# Patient Record
Sex: MALE | Race: WHITE | NOT HISPANIC OR LATINO
[De-identification: names, ages, dates, MRNs, and addresses within clinical notes are randomized per-mention and may not be internally consistent; named-entity substitution may affect disease eponyms.]

---

## 2018-07-01 ENCOUNTER — TRANSCRIPTION ENCOUNTER (OUTPATIENT)
Age: 58
End: 2018-07-01

## 2019-01-11 ENCOUNTER — NON-APPOINTMENT (OUTPATIENT)
Age: 59
End: 2019-01-11

## 2019-01-11 ENCOUNTER — APPOINTMENT (OUTPATIENT)
Dept: FAMILY MEDICINE | Facility: CLINIC | Age: 59
End: 2019-01-11
Payer: COMMERCIAL

## 2019-01-11 VITALS
SYSTOLIC BLOOD PRESSURE: 124 MMHG | DIASTOLIC BLOOD PRESSURE: 84 MMHG | HEART RATE: 83 BPM | WEIGHT: 214 LBS | OXYGEN SATURATION: 95 % | BODY MASS INDEX: 28.99 KG/M2 | HEIGHT: 72 IN

## 2019-01-11 VITALS — DIASTOLIC BLOOD PRESSURE: 70 MMHG | SYSTOLIC BLOOD PRESSURE: 128 MMHG

## 2019-01-11 DIAGNOSIS — Z13.1 ENCOUNTER FOR SCREENING FOR DIABETES MELLITUS: ICD-10-CM

## 2019-01-11 DIAGNOSIS — E78.00 PURE HYPERCHOLESTEROLEMIA, UNSPECIFIED: ICD-10-CM

## 2019-01-11 DIAGNOSIS — Z00.00 ENCOUNTER FOR GENERAL ADULT MEDICAL EXAMINATION W/OUT ABNORMAL FINDINGS: ICD-10-CM

## 2019-01-11 DIAGNOSIS — Z12.5 ENCOUNTER FOR SCREENING FOR MALIGNANT NEOPLASM OF PROSTATE: ICD-10-CM

## 2019-01-11 DIAGNOSIS — E55.9 VITAMIN D DEFICIENCY, UNSPECIFIED: ICD-10-CM

## 2019-01-11 DIAGNOSIS — Z13.29 ENCOUNTER FOR SCREENING FOR OTHER SUSPECTED ENDOCRINE DISORDER: ICD-10-CM

## 2019-01-11 DIAGNOSIS — Z83.2 FAMILY HISTORY OF DISEASES OF THE BLOOD AND BLOOD-FORMING ORGANS AND CERTAIN DISORDERS INVOLVING THE IMMUNE MECHANISM: ICD-10-CM

## 2019-01-11 DIAGNOSIS — Z84.1 FAMILY HISTORY OF DISORDERS OF KIDNEY AND URETER: ICD-10-CM

## 2019-01-11 DIAGNOSIS — B35.3 TINEA PEDIS: ICD-10-CM

## 2019-01-11 PROCEDURE — 93000 ELECTROCARDIOGRAM COMPLETE: CPT

## 2019-01-11 PROCEDURE — 99396 PREV VISIT EST AGE 40-64: CPT | Mod: 25

## 2019-01-11 RX ORDER — ECONAZOLE NITRATE 10 MG/G
1 CREAM TOPICAL DAILY
Qty: 1 | Refills: 2 | Status: ACTIVE | COMMUNITY
Start: 2019-01-11 | End: 1900-01-01

## 2019-01-11 NOTE — ASSESSMENT
[FreeTextEntry1] : EKG performed: SR at 78 bpm, normal axis, no ST/T  changes  \par \par see lab orders \par \par see med orders \par \par see radiology orders

## 2019-01-11 NOTE — PHYSICAL EXAM
[No Acute Distress] : no acute distress [Well Nourished] : well nourished [Well Developed] : well developed [Well-Appearing] : well-appearing [PERRL] : pupils equal round and reactive to light [EOMI] : extraocular movements intact [Normal Oropharynx] : the oropharynx was normal [No JVD] : no jugular venous distention [Supple] : supple [No Lymphadenopathy] : no lymphadenopathy [No Respiratory Distress] : no respiratory distress  [Clear to Auscultation] : lungs were clear to auscultation bilaterally [No Accessory Muscle Use] : no accessory muscle use [Normal Rate] : normal rate  [Regular Rhythm] : with a regular rhythm [Normal S1, S2] : normal S1 and S2 [No Carotid Bruits] : no carotid bruits [Soft] : abdomen soft [Non Tender] : non-tender [No Masses] : no abdominal mass palpated [No HSM] : no HSM [Normal Bowel Sounds] : normal bowel sounds [Normal Posterior Cervical Nodes] : no posterior cervical lymphadenopathy [Normal Anterior Cervical Nodes] : no anterior cervical lymphadenopathy [No CVA Tenderness] : no CVA  tenderness [Grossly Normal Strength/Tone] : grossly normal strength/tone [No Rash] : no rash [Normal Gait] : normal gait [Coordination Grossly Intact] : coordination grossly intact [No Focal Deficits] : no focal deficits [Normal Affect] : the affect was normal [Normal Mood] : the mood was normal [Normal Insight/Judgement] : insight and judgment were intact

## 2019-01-11 NOTE — HEALTH RISK ASSESSMENT
[Patient reported colonoscopy was normal] : Patient reported colonoscopy was normal [ColonoscopyDate] : 05/12 [ColonoscopyComments] : +polyp repeat 05/15

## 2019-01-11 NOTE — HISTORY OF PRESENT ILLNESS
[FreeTextEntry1] : patient here for CPE [de-identified] : CPE \par as above negative FAST no CP/SOB c activity +active workday no dizziness no palpitations +BM qd-bid no bloody/black stools \par no urinary complaints    \par \par +smoker <1ppd x 40 yrs

## 2019-02-25 ENCOUNTER — MEDICATION RENEWAL (OUTPATIENT)
Age: 59
End: 2019-02-25

## 2019-02-26 ENCOUNTER — MEDICATION RENEWAL (OUTPATIENT)
Age: 59
End: 2019-02-26

## 2022-02-24 ENCOUNTER — APPOINTMENT (OUTPATIENT)
Dept: ORTHOPEDIC SURGERY | Facility: CLINIC | Age: 62
End: 2022-02-24
Payer: COMMERCIAL

## 2022-02-24 VITALS
HEART RATE: 85 BPM | BODY MASS INDEX: 28.44 KG/M2 | WEIGHT: 210 LBS | SYSTOLIC BLOOD PRESSURE: 130 MMHG | HEIGHT: 72 IN | DIASTOLIC BLOOD PRESSURE: 75 MMHG

## 2022-02-24 PROCEDURE — 73030 X-RAY EXAM OF SHOULDER: CPT | Mod: RT

## 2022-02-24 PROCEDURE — 99204 OFFICE O/P NEW MOD 45 MIN: CPT | Mod: 25

## 2022-02-24 PROCEDURE — 20610 DRAIN/INJ JOINT/BURSA W/O US: CPT | Mod: RT

## 2022-02-25 NOTE — HISTORY OF PRESENT ILLNESS
[Stable] : stable [___ mths] : [unfilled] month(s) ago [2] : a current pain level of 2/10 [3] : an average pain level of 3/10 [Lifting] : worsened by lifting [Acetaminophen] : relieved by acetaminophen [NSAIDs] : relieved by nonsteroidal anti-inflammatory drugs [Rest] : relieved by rest [de-identified] : HEIKE ISBELL is a 61 year y/o male who presents for initial visit of Right shoulder pain. He reports constant pain since past Oct 2021, with no specific ALMA. He reports dull achy pain, that is worsened with most shoulder movement such as FF. He localizes most of his pain over supraspinatus attachment by humeral head. He denies numbness or tingling down to extremity. He denies radiating pain down to elbow. He is able to sleep through night, but reports on few days waking up due to pain. He has not done any formal PT for this. He occasionally takes OTC NSAIDs that provide relief.

## 2022-02-25 NOTE — ADDENDUM
[FreeTextEntry1] : Documented by Tito Shah acting as a scribe for Dr. Barillas on 02/24/2022. \par \par All medical record entries made by the Scribe were at my, Dr. Barillas's, direction and\par personally dictated by me on 02/24/2022. I have reviewed the chart and agree that the record\par accurately reflects my personal performance of the history, physical exam, procedure and imaging.

## 2022-02-25 NOTE — PHYSICAL EXAM
[de-identified] : Physical Exam:\par General: Well appearing, no acute distress\par Neurologic: A&Ox3, No focal deficits\par Head: NCAT without abrasions, lacerations, or ecchymosis to head, face, or scalp\par Eyes: No scleral icterus, no gross abnormalities\par Respiratory: Equal chest wall expansion bilaterally, no accessory muscle use\par Lymphatic: No lymphadenopathy palpated\par Skin: Warm and dry\par Psychiatric: Normal mood and affect\par \par C-Spine\par Palpation: Tenderness to paraspinal muscles and trapezius muscle\par ROM: side bending, symmetrical. Pain with extension and flexion of the neck.\par Reflexes: C5-7 [normal]\par \par Right Shoulder\par ·	Inspection/Palpation: Tenderness at supraspinatus insertion, no swelling or deformities\par ·	Range of Motion: no crepitus with ROM; Active FF 0-160; ER at side 45; IR to back pocket with pain ; Passive FF 0-160; ER at side 45; IR to back pocket with pain\par ·	Strength: forward elevation in scapular plane 5/5, internal rotation 5/5, external rotation 5/5, adduction 5/5 and abduction 5/5 but hitching\par ·	Stability: no joint instability on provocative testing\par ·	Tests: Sol test negative, Neer sign negative, POS drop arm test secondary to pain, bear hug test POS, Napolean sign POS, cross arm adduction POS, lift off sign positive, hornblowers sign negative, speeds test POS, Yergason's test POS, no bicipital groove tenderness, Whipple's Active Compression test negative \par \par Left Shoulder\par ·	Inspection/Palpation: no tenderness, swelling or deformities\par ·	Range of Motion: full and painless in all planes, no crepitus\par ·	Strength: forward elevation in scapular plane 5/5, internal rotation 5/5, external rotation 5/5, adduction 5/5 and abduction 5/5\par ·	Stability: no joint instability on provocative testing\par ·	Tests: Sol test negative, Neer sign negative, negative drop arm test secondary to pain, bear hug test negative, Napolean sign negative, cross arm adduction negative, lift off sign positive, hornblowers sign negative, speeds test negative, Yergason's test negative, no bicipital groove tenderness, Whipple's Active Compression test negative  [de-identified] : The following radiographs were ordered and read by me during this patients visit. I reviewed each radiograph in detail with the patient and discussed the findings as highlighted below. \par \par 4 views of the Right shoulder show calcium deposit noted at supraspinatus attachment, otherwise, no fracture, dislocation or bony lesions. There is no evidence of degenerative change in the glenohumeral and acromioclavicular joints with maintenance of the joint space. Otherwise unremarkable.

## 2022-02-25 NOTE — DISCUSSION/SUMMARY
[de-identified] : HEIKE ISBELL is a 61 year y/o male who presents for initial visit of Right shoulder pain. He reports constant pain since past Oct 2021, with no specific ALMA. He reports dull achy pain, that is worsened with most shoulder movement such as FF. He localizes most of his pain over supraspinatus attachment by humeral head. He denies numbness or tingling down to extremity. He denies radiating pain down to elbow. He is able to sleep through night, but reports on few days waking up due to pain. He has not done any formal PT for this. He occasionally takes OTC NSAIDs that provide relief. \par \par We had a thorough discussion regarding the nature of his pain, the pathophysiology, as well as all treatment options. The patient presents today with acute onset of Right shoulder pain consistent with a clinical diagnosis of calcific tendinopathy. Radiographs show a calcification at the rotator cuff insertion. Clinically, the patient specific point tenderness to this location as well as positive impingement signs. I discussed the pathophysiology of the diagnosis and the distinction between the resorptive and formative phase.  I discussed the treatment of calcific tendinitis with the patient at length today and the inflammatory process it incites during resorptive phase.\par \par I discussed the treatment of calcific tendinitis with the patient including nonoperative management as first line treatment. Anti-inflammatory medications (NSAID's) with physical therapy for strengthening and conditioning of the joint to preserve shoulder range of motion is typically required. Corticosteroid injection and/or US guided percutaneous aspiration and lavage may be indicated for refractory cases and for acute symptomatic pain relief. If nonoperative management fails, arthroscopic debridement with possible rotator cuff repair if damaged during surgery may be required for recalcitrant cases.\par \par The patient elected to proceed with nonoperative management including a cortisone injection performed today that he tolerated well alongside starting formal PT in 1 week from today, 2x/week until we see them again. We will see them again for clinical reassessment in 6-8 weeks. He agrees with the above plan and all questions were answered.

## 2022-02-25 NOTE — PROCEDURE
[de-identified] : Injection: Right shoulder (Subacromial). \par Indication: Calcific tendinitis \par \par A discussion was had with the patient regarding this procedure and all questions were answered. All risks, benefits and alternatives were discussed. These included but were not limited to bleeding, infection, and allergic reaction. Alcohol was used to clean the skin, and betadine was used to sterilize and prep the area in the posterior aspect of the right shoulder. Ethyl chloride spray was then used as a topical anesthetic. A 22-gauge needle was used to inject 3cc 1% xylocaine, 2cc 0.25% bupivacaine and 1cc of 40mg/mL triamcinolone acetonide into the right subacromial space. A sterile bandage was then applied. The patient tolerated the procedure well and there were no complications.

## 2022-04-28 ENCOUNTER — APPOINTMENT (OUTPATIENT)
Dept: ORTHOPEDIC SURGERY | Facility: CLINIC | Age: 62
End: 2022-04-28
Payer: COMMERCIAL

## 2022-04-28 VITALS
DIASTOLIC BLOOD PRESSURE: 76 MMHG | WEIGHT: 208 LBS | RESPIRATION RATE: 16 BRPM | SYSTOLIC BLOOD PRESSURE: 128 MMHG | BODY MASS INDEX: 28.17 KG/M2 | HEIGHT: 72 IN

## 2022-04-28 PROCEDURE — 99214 OFFICE O/P EST MOD 30 MIN: CPT

## 2022-04-29 NOTE — DISCUSSION/SUMMARY
[de-identified] : I had a lengthy discussion with the patient regarding his condition.  We discussed the treatment plan.  At this time I referred him for an MRI due to his failure to improve with conservative management including cortisone injection and oral medications.  He will follow-up after the MRI results are available.  We discussed the potential barbotage procedure as well as arthroscopic debridement.  We also discussed the potential for rotator cuff tear.  All questions were answered.

## 2022-04-29 NOTE — HISTORY OF PRESENT ILLNESS
[2] : a current pain level of 2/10 [3] : an average pain level of 3/10 [Lifting] : worsened by lifting [Acetaminophen] : relieved by acetaminophen [NSAIDs] : relieved by nonsteroidal anti-inflammatory drugs [Rest] : relieved by rest [Improving] : improving [___ mths] : [unfilled] month(s) ago [de-identified] : HEIKE ISBELL is a 61 year y/o male who presents for f/u visit of Right shoulder pain. At last visit, patient was dx with calcific tendinitis and tx with cortisone injection. Currently, he reports 10-20% improvement. Still notes pain. No improvement with oral meds.  He is frustrated with his continued pain.  He works in IT.

## 2022-04-29 NOTE — PHYSICAL EXAM
[de-identified] : Physical Exam:\par General: Well appearing, no acute distress\par Neurologic: A&Ox3, No focal deficits\par Head: NCAT without abrasions, lacerations, or ecchymosis to head, face, or scalp\par Eyes: No scleral icterus, no gross abnormalities\par Respiratory: Equal chest wall expansion bilaterally, no accessory muscle use\par Lymphatic: No lymphadenopathy palpated\par Skin: Warm and dry\par Psychiatric: Normal mood and affect\par \par \par Right Shoulder\par ·	Inspection/Palpation: Tenderness at supraspinatus insertion, no swelling or deformities\par ·	Range of Motion: no crepitus with ROM; Active FF 0-140; ER at side 45; IR to back pocket with pain ; Passive FF 0-150; ER at side 45; IR to back pocket with pain\par ·	Strength: forward elevation in scapular plane 5/5, internal rotation 5/5, external rotation 5/5, adduction 5/5 and abduction 5/5 but hitching\par ·	Stability: no joint instability on provocative testing\par ·	Tests: Sol test negative, Neer sign negative, POS drop arm test secondary to pain, bear hug test POS, Napolean sign POS, cross arm adduction POS, lift off sign positive, hornblowers sign negative, speeds test POS, Yergason's test POS, no bicipital groove tenderness, Whipple's Active Compression test negative \par \par Left Shoulder\par ·	Inspection/Palpation: no tenderness, swelling or deformities\par ·	Range of Motion: full and painless in all planes, no crepitus\par ·	Strength: forward elevation in scapular plane 5/5, internal rotation 5/5, external rotation 5/5, adduction 5/5 and abduction 5/5\par ·	Stability: no joint instability on provocative testing\par ·	Tests: Sol test negative, Neer sign negative, negative drop arm test secondary to pain, bear hug test negative, Napolean sign negative, cross arm adduction negative, lift off sign positive, hornblowers sign negative, speeds test negative, Yergason's test negative, no bicipital groove tenderness, Whipple's Active Compression test negative

## 2022-05-19 ENCOUNTER — APPOINTMENT (OUTPATIENT)
Dept: ORTHOPEDIC SURGERY | Facility: CLINIC | Age: 62
End: 2022-05-19

## 2022-05-23 ENCOUNTER — NON-APPOINTMENT (OUTPATIENT)
Age: 62
End: 2022-05-23

## 2022-05-25 ENCOUNTER — APPOINTMENT (OUTPATIENT)
Dept: ORTHOPEDIC SURGERY | Facility: CLINIC | Age: 62
End: 2022-05-25
Payer: COMMERCIAL

## 2022-05-25 DIAGNOSIS — M75.31 CALCIFIC TENDINITIS OF RIGHT SHOULDER: ICD-10-CM

## 2022-05-25 PROCEDURE — 99442: CPT

## 2022-05-26 PROBLEM — M75.31 CALCIFIC TENDINITIS OF RIGHT SHOULDER: Status: ACTIVE | Noted: 2022-02-24

## 2023-03-14 ENCOUNTER — APPOINTMENT (OUTPATIENT)
Dept: ULTRASOUND IMAGING | Facility: CLINIC | Age: 63
End: 2023-03-14
Payer: COMMERCIAL

## 2023-03-14 ENCOUNTER — RESULT REVIEW (OUTPATIENT)
Age: 63
End: 2023-03-14

## 2023-03-14 ENCOUNTER — OUTPATIENT (OUTPATIENT)
Dept: OUTPATIENT SERVICES | Facility: HOSPITAL | Age: 63
LOS: 1 days | End: 2023-03-14
Payer: COMMERCIAL

## 2023-03-14 DIAGNOSIS — M75.31 CALCIFIC TENDINITIS OF RIGHT SHOULDER: ICD-10-CM

## 2023-03-14 PROCEDURE — 20611 DRAIN/INJ JOINT/BURSA W/US: CPT | Mod: RT

## 2023-03-14 PROCEDURE — 20611 DRAIN/INJ JOINT/BURSA W/US: CPT

## 2024-06-18 ENCOUNTER — APPOINTMENT (OUTPATIENT)
Dept: ORTHOPEDIC SURGERY | Facility: CLINIC | Age: 64
End: 2024-06-18
Payer: COMMERCIAL

## 2024-06-18 PROCEDURE — 73140 X-RAY EXAM OF FINGER(S): CPT | Mod: LT

## 2024-06-18 PROCEDURE — 99204 OFFICE O/P NEW MOD 45 MIN: CPT

## 2024-06-18 NOTE — IMAGING
[de-identified] : Left middle and ring finger with swelling, ski intact. Lacerations approximated with suture, no erythema nor draiange. Able to gently flex and extend at MCP, PIP and DIP with no rotational deformity. Sensation intact at radial and ulnar pulp. <2sec cap refill.   Left hand radiographs with middle finger distal phalanx comminuted fracture.

## 2024-06-18 NOTE — ASSESSMENT
[FreeTextEntry1] : Left middle and ring finger lacerations with middle finger distal phalanx open fracture -  reviewed radiographs and pathoanatomy with patient. Discussed the current alignment both radiographically and clinically are within acceptable parameters to manage nonoperatively. Will manage in coban yuko tape, ROM permitted, NWB. Elevate. Discussed risks of pain, stiffness, and displacement requiring intervention. Risk of infection - complete course of abx.  F/u 1 week to reassess (sutures out at 2 weeks)

## 2024-06-18 NOTE — HISTORY OF PRESENT ILLNESS
[de-identified] : 64M, RHD, presents today complaining of left middle and ring finger. Patient was doing yardwork on 06/15/24 and the tip of his middle finger came in contact with a . Went to [facility] on 06/15/24, both fingers required some stitching as well as XR imaging being done. Patient was prescribed oxycodone, cephalexin, and acetaminophen. Denies numbness/ tingling.

## 2024-06-25 ENCOUNTER — APPOINTMENT (OUTPATIENT)
Dept: ORTHOPEDIC SURGERY | Facility: CLINIC | Age: 64
End: 2024-06-25
Payer: COMMERCIAL

## 2024-06-25 DIAGNOSIS — S69.90XA UNSPECIFIED INJURY OF UNSPECIFIED WRIST, HAND AND FINGER(S), INITIAL ENCOUNTER: ICD-10-CM

## 2024-06-25 PROCEDURE — 99213 OFFICE O/P EST LOW 20 MIN: CPT

## 2024-06-29 PROBLEM — S69.90XA FINGER INJURY: Status: ACTIVE | Noted: 2024-06-18

## 2024-07-02 ENCOUNTER — APPOINTMENT (OUTPATIENT)
Dept: ORTHOPEDIC SURGERY | Facility: CLINIC | Age: 64
End: 2024-07-02
Payer: COMMERCIAL

## 2024-07-02 DIAGNOSIS — S69.90XA UNSPECIFIED INJURY OF UNSPECIFIED WRIST, HAND AND FINGER(S), INITIAL ENCOUNTER: ICD-10-CM

## 2024-07-02 PROCEDURE — 99213 OFFICE O/P EST LOW 20 MIN: CPT

## 2024-07-30 ENCOUNTER — APPOINTMENT (OUTPATIENT)
Dept: ORTHOPEDIC SURGERY | Facility: CLINIC | Age: 64
End: 2024-07-30

## 2024-09-17 ENCOUNTER — APPOINTMENT (OUTPATIENT)
Dept: ORTHOPEDIC SURGERY | Facility: CLINIC | Age: 64
End: 2024-09-17
Payer: COMMERCIAL

## 2024-09-17 DIAGNOSIS — S69.90XA UNSPECIFIED INJURY OF UNSPECIFIED WRIST, HAND AND FINGER(S), INITIAL ENCOUNTER: ICD-10-CM

## 2024-09-17 PROCEDURE — 99214 OFFICE O/P EST MOD 30 MIN: CPT

## 2024-09-17 RX ORDER — AMOXICILLIN AND CLAVULANATE POTASSIUM 875; 125 MG/1; MG/1
875-125 TABLET, COATED ORAL TWICE DAILY
Qty: 20 | Refills: 0 | Status: ACTIVE | COMMUNITY
Start: 2024-09-17 | End: 1900-01-01

## 2024-09-17 NOTE — HISTORY OF PRESENT ILLNESS
[de-identified] : 64M, RHD, presents today complaining of left middle and ring finger. Patient was doing yardwork on 06/15/24 and the tip of his middle finger came in contact with a . Went to [facility] on 06/15/24, both fingers required some stitching as well as XR imaging being done. Patient was prescribed oxycodone, cephalexin, and acetaminophen. Denies numbness/ tingling.  6/25/25 Left middle and ring finger follow up, reports symptoms are steadily improving. Continues to experience pins and needles with touch.  7/2/24: Left middle and ring finger follow up, reports symptoms have significantly improved since last visit. Experiencing tingling sensation with touch.   9/17/24: Left middle and ring finger follow up. Went to urgent care beginning of September due to an infection developing in which he was prescribed antibiotics with some improvement. Continues to experience minimal pain and tingling. Denies attending OT.

## 2024-09-17 NOTE — IMAGING
[de-identified] : Left middle and ring finger with resolved swelling, ski intact. Skin healed, mild erythema nor drainage. Able to gently flex and extend at MCP, PIP and DIP with no rotational deformity. Sensation intact at radial and ulnar pulp. <2sec cap refill.   Left hand radiographs with middle finger distal phalanx comminuted fracture.

## 2024-09-17 NOTE — ASSESSMENT
[FreeTextEntry1] : Left middle and ring finger lacerations with middle finger distal phalanx open fracture -  reviewed radiographs and pathoanatomy with patient. Discussed the current alignment both radiographically and clinically are within acceptable parameters to manage nonoperatively. Will manage in coban yuko tape, ROM permitted, NWB. Elevate. Discussed risks of pain, stiffness, and displacement requiring intervention. Risk of infection - complete course of abx.  Mild erythema persists, while much improvement, will continue abx. Augmentin script sent to pharmacy. Peroxide soaks TID.  F/u 2 week

## 2024-10-01 ENCOUNTER — APPOINTMENT (OUTPATIENT)
Dept: ORTHOPEDIC SURGERY | Facility: CLINIC | Age: 64
End: 2024-10-01
Payer: COMMERCIAL

## 2024-10-01 DIAGNOSIS — S69.90XA UNSPECIFIED INJURY OF UNSPECIFIED WRIST, HAND AND FINGER(S), INITIAL ENCOUNTER: ICD-10-CM

## 2024-10-01 PROCEDURE — 99213 OFFICE O/P EST LOW 20 MIN: CPT

## 2024-10-01 NOTE — ASSESSMENT
[FreeTextEntry1] : Left middle and ring finger lacerations with middle finger distal phalanx open fracture -  reviewed radiographs and pathoanatomy with patient. Discussed the current alignment both radiographically and clinically are within acceptable parameters to manage nonoperatively. Will manage in coban yuko tape, ROM permitted. Elevate. Discussed risks of pain, stiffness, and displacement requiring intervention. Risk of infection.  Discussed erythema has resolved, no fluctuance, full motion. Does not appear infected at this time. WBAT, ease into use. F/u sooner should infection ensue.  F/u 6week

## 2024-10-01 NOTE — HISTORY OF PRESENT ILLNESS
[de-identified] : 64M, RHD, presents today complaining of left middle and ring finger. Patient was doing yardwork on 06/15/24 and the tip of his middle finger came in contact with a . Went to [facility] on 06/15/24, both fingers required some stitching as well as XR imaging being done. Patient was prescribed oxycodone, cephalexin, and acetaminophen. Denies numbness/ tingling.  6/25/25 Left middle and ring finger follow up, reports symptoms are steadily improving. Continues to experience pins and needles with touch.  7/2/24: Left middle and ring finger follow up, reports symptoms have significantly improved since last visit. Experiencing tingling sensation with touch.   9/17/24: Left middle and ring finger follow up. Went to urgent care beginning of September due to an infection developing in which he was prescribed antibiotics with some improvement. Continues to experience minimal pain and tingling. Denies attending OT.   10/1/24: Follow up for the left middle finger. Patient states the infection is still present, and its sensitive to the touch. Denies taking any medication for the discomfort. Since last visit he feels his finger has improved 30%.

## 2024-10-01 NOTE — HISTORY OF PRESENT ILLNESS
[de-identified] : 64M, RHD, presents today complaining of left middle and ring finger. Patient was doing yardwork on 06/15/24 and the tip of his middle finger came in contact with a . Went to [facility] on 06/15/24, both fingers required some stitching as well as XR imaging being done. Patient was prescribed oxycodone, cephalexin, and acetaminophen. Denies numbness/ tingling.  6/25/25 Left middle and ring finger follow up, reports symptoms are steadily improving. Continues to experience pins and needles with touch.  7/2/24: Left middle and ring finger follow up, reports symptoms have significantly improved since last visit. Experiencing tingling sensation with touch.   9/17/24: Left middle and ring finger follow up. Went to urgent care beginning of September due to an infection developing in which he was prescribed antibiotics with some improvement. Continues to experience minimal pain and tingling. Denies attending OT.   10/1/24: Follow up for the left middle finger. Patient states the infection is still present, and its sensitive to the touch. Denies taking any medication for the discomfort. Since last visit he feels his finger has improved 30%.

## 2024-10-01 NOTE — IMAGING
[de-identified] : Left middle and ring finger with resolved swelling, skin intact. Skin healed, mild erythema nor drainage. Able to flex and extend at MCP, PIP and DIP with no rotational deformity. Sensation intact at radial and ulnar pulp. <2sec cap refill. Hyperemia, no fluctuance, minimal ttp.  Left hand radiographs with middle finger distal phalanx comminuted fracture.

## 2024-10-01 NOTE — IMAGING
[de-identified] : Left middle and ring finger with resolved swelling, skin intact. Skin healed, mild erythema nor drainage. Able to flex and extend at MCP, PIP and DIP with no rotational deformity. Sensation intact at radial and ulnar pulp. <2sec cap refill. Hyperemia, no fluctuance, minimal ttp.  Left hand radiographs with middle finger distal phalanx comminuted fracture.